# Patient Record
Sex: FEMALE | Race: WHITE | NOT HISPANIC OR LATINO
[De-identification: names, ages, dates, MRNs, and addresses within clinical notes are randomized per-mention and may not be internally consistent; named-entity substitution may affect disease eponyms.]

---

## 2021-03-21 ENCOUNTER — TRANSCRIPTION ENCOUNTER (OUTPATIENT)
Age: 56
End: 2021-03-21

## 2021-10-04 PROBLEM — Z00.00 ENCOUNTER FOR PREVENTIVE HEALTH EXAMINATION: Status: ACTIVE | Noted: 2021-10-04

## 2021-10-25 ENCOUNTER — APPOINTMENT (OUTPATIENT)
Dept: PHARMACY | Facility: CLINIC | Age: 56
End: 2021-10-25
Payer: SELF-PAY

## 2021-10-25 PROCEDURE — V5010 ASSESSMENT FOR HEARING AID: CPT | Mod: NC

## 2021-11-08 ENCOUNTER — APPOINTMENT (OUTPATIENT)
Dept: PHARMACY | Facility: CLINIC | Age: 56
End: 2021-11-08
Payer: COMMERCIAL

## 2021-11-08 PROCEDURE — V5261A: CUSTOM

## 2021-11-22 ENCOUNTER — NON-APPOINTMENT (OUTPATIENT)
Age: 56
End: 2021-11-22

## 2021-11-22 ENCOUNTER — APPOINTMENT (OUTPATIENT)
Dept: PHARMACY | Facility: CLINIC | Age: 56
End: 2021-11-22

## 2021-12-06 ENCOUNTER — APPOINTMENT (OUTPATIENT)
Dept: PHARMACY | Facility: CLINIC | Age: 56
End: 2021-12-06
Payer: SELF-PAY

## 2021-12-06 PROCEDURE — V5299A: CUSTOM | Mod: NC

## 2022-01-03 ENCOUNTER — APPOINTMENT (OUTPATIENT)
Dept: PHARMACY | Facility: CLINIC | Age: 57
End: 2022-01-03

## 2022-08-18 ENCOUNTER — APPOINTMENT (OUTPATIENT)
Dept: PHARMACY | Facility: CLINIC | Age: 57
End: 2022-08-18

## 2022-08-18 PROCEDURE — V5299A: CUSTOM | Mod: NC

## 2022-09-22 ENCOUNTER — NON-APPOINTMENT (OUTPATIENT)
Age: 57
End: 2022-09-22

## 2022-10-03 ENCOUNTER — APPOINTMENT (OUTPATIENT)
Dept: PHARMACY | Facility: CLINIC | Age: 57
End: 2022-10-03

## 2022-10-03 PROCEDURE — V5299A: CUSTOM | Mod: NC

## 2023-02-08 ENCOUNTER — APPOINTMENT (OUTPATIENT)
Dept: OTOLARYNGOLOGY | Facility: CLINIC | Age: 58
End: 2023-02-08
Payer: COMMERCIAL

## 2023-02-08 VITALS — HEIGHT: 69 IN | TEMPERATURE: 96.7 F | WEIGHT: 185 LBS | BODY MASS INDEX: 27.4 KG/M2

## 2023-02-08 DIAGNOSIS — H93.13 TINNITUS, BILATERAL: ICD-10-CM

## 2023-02-08 DIAGNOSIS — T16.1XXA FOREIGN BODY IN RIGHT EAR, INITIAL ENCOUNTER: ICD-10-CM

## 2023-02-08 DIAGNOSIS — H90.3 SENSORINEURAL HEARING LOSS, BILATERAL: ICD-10-CM

## 2023-02-08 PROCEDURE — 92557 COMPREHENSIVE HEARING TEST: CPT

## 2023-02-08 PROCEDURE — 92567 TYMPANOMETRY: CPT

## 2023-02-08 PROCEDURE — 99204 OFFICE O/P NEW MOD 45 MIN: CPT | Mod: 25

## 2023-02-08 PROCEDURE — 69200 CLEAR OUTER EAR CANAL: CPT

## 2023-02-08 NOTE — REVIEW OF SYSTEMS
[Hearing Loss] : hearing loss [Ear Noises] : ear noises [Negative] : Heme/Lymph [de-identified] : different sounds at night

## 2023-02-08 NOTE — PHYSICAL EXAM
[Midline] : trachea located in midline position [Normal] : no rashes [de-identified] : dome of hearing aide in right eac; left normal - exam normal after removal

## 2023-02-08 NOTE — ASSESSMENT
[FreeTextEntry1] : Patient wears hearing aides but having more difficulty using aides.  FB of right ear removed (dome of aide) - exam normal after .  Audio shows some sl progression of snhl but  patient noted to have poor discrim.  Will get MRI - if negative would continue with hearing aides.   Also explained tinnitus related to snhl.  followup in 6 mos if MRI negative.

## 2023-02-08 NOTE — HISTORY OF PRESENT ILLNESS
[de-identified] : Patient does wear hearing aides.  Concerned about possible change in hearing.  Had prior audio at [facility] done 10/2021.  Patient also notes bilateral tinnitus

## 2023-02-21 ENCOUNTER — APPOINTMENT (OUTPATIENT)
Dept: PHARMACY | Facility: CLINIC | Age: 58
End: 2023-02-21

## 2023-03-13 ENCOUNTER — APPOINTMENT (OUTPATIENT)
Dept: PHARMACY | Facility: CLINIC | Age: 58
End: 2023-03-13
Payer: SELF-PAY

## 2023-03-13 PROCEDURE — V5299A: CUSTOM | Mod: NC

## 2023-04-06 ENCOUNTER — APPOINTMENT (OUTPATIENT)
Dept: PHARMACY | Facility: CLINIC | Age: 58
End: 2023-04-06
Payer: SELF-PAY

## 2023-04-06 PROCEDURE — V5299A: CUSTOM | Mod: NC

## 2023-04-06 PROCEDURE — V5267T: CUSTOM | Mod: 25

## 2023-09-11 ENCOUNTER — APPOINTMENT (OUTPATIENT)
Dept: PHARMACY | Facility: CLINIC | Age: 58
End: 2023-09-11
Payer: SELF-PAY

## 2023-09-11 PROCEDURE — V5299A: CUSTOM

## 2024-04-11 ENCOUNTER — APPOINTMENT (OUTPATIENT)
Dept: PHARMACY | Facility: CLINIC | Age: 59
End: 2024-04-11

## 2024-04-22 ENCOUNTER — APPOINTMENT (OUTPATIENT)
Dept: PHARMACY | Facility: CLINIC | Age: 59
End: 2024-04-22
Payer: SELF-PAY

## 2024-04-22 PROCEDURE — V5299A: CUSTOM

## 2024-06-10 ENCOUNTER — APPOINTMENT (OUTPATIENT)
Dept: OTOLARYNGOLOGY | Facility: CLINIC | Age: 59
End: 2024-06-10

## 2024-06-10 ENCOUNTER — APPOINTMENT (OUTPATIENT)
Dept: PHARMACY | Facility: CLINIC | Age: 59
End: 2024-06-10

## 2024-11-18 ENCOUNTER — APPOINTMENT (OUTPATIENT)
Dept: PHARMACY | Facility: CLINIC | Age: 59
End: 2024-11-18

## 2025-09-06 ENCOUNTER — NON-APPOINTMENT (OUTPATIENT)
Age: 60
End: 2025-09-06

## 2025-09-13 ENCOUNTER — NON-APPOINTMENT (OUTPATIENT)
Age: 60
End: 2025-09-13

## 2025-09-15 ENCOUNTER — APPOINTMENT (OUTPATIENT)
Dept: PHARMACY | Facility: CLINIC | Age: 60
End: 2025-09-15
Payer: SELF-PAY

## 2025-09-15 PROCEDURE — V5299A: CUSTOM
